# Patient Record
Sex: FEMALE
[De-identification: names, ages, dates, MRNs, and addresses within clinical notes are randomized per-mention and may not be internally consistent; named-entity substitution may affect disease eponyms.]

---

## 2020-06-16 ENCOUNTER — TRANSCRIPTION ENCOUNTER (OUTPATIENT)
Age: 30
End: 2020-06-16

## 2021-02-03 ENCOUNTER — EMERGENCY (EMERGENCY)
Facility: HOSPITAL | Age: 31
LOS: 1 days | Discharge: HOME | End: 2021-02-03
Attending: EMERGENCY MEDICINE | Admitting: EMERGENCY MEDICINE
Payer: MEDICAID

## 2021-02-03 VITALS
SYSTOLIC BLOOD PRESSURE: 133 MMHG | HEART RATE: 87 BPM | DIASTOLIC BLOOD PRESSURE: 80 MMHG | RESPIRATION RATE: 18 BRPM | TEMPERATURE: 98 F | OXYGEN SATURATION: 100 %

## 2021-02-03 PROCEDURE — 99284 EMERGENCY DEPT VISIT MOD MDM: CPT

## 2021-02-03 PROCEDURE — 73630 X-RAY EXAM OF FOOT: CPT | Mod: 26,RT

## 2021-02-03 PROCEDURE — 73590 X-RAY EXAM OF LOWER LEG: CPT | Mod: 26,RT

## 2021-02-03 PROCEDURE — 73610 X-RAY EXAM OF ANKLE: CPT | Mod: 26,RT

## 2021-02-03 RX ORDER — IBUPROFEN 200 MG
600 TABLET ORAL ONCE
Refills: 0 | Status: COMPLETED | OUTPATIENT
Start: 2021-02-03 | End: 2021-02-03

## 2021-02-03 RX ORDER — METHADONE HYDROCHLORIDE 40 MG/1
65 TABLET ORAL ONCE
Refills: 0 | Status: DISCONTINUED | OUTPATIENT
Start: 2021-02-03 | End: 2021-02-03

## 2021-02-03 RX ORDER — ACETAMINOPHEN 500 MG
650 TABLET ORAL ONCE
Refills: 0 | Status: COMPLETED | OUTPATIENT
Start: 2021-02-03 | End: 2021-02-03

## 2021-02-03 RX ADMIN — Medication 650 MILLIGRAM(S): at 14:03

## 2021-02-03 RX ADMIN — Medication 600 MILLIGRAM(S): at 15:52

## 2021-02-03 RX ADMIN — METHADONE HYDROCHLORIDE 65 MILLIGRAM(S): 40 TABLET ORAL at 15:03

## 2021-02-03 NOTE — ED PROVIDER NOTE - OBJECTIVE STATEMENT
Pt is a 29 yo F who present with R ankle injury. She slipped on ice today walking to the bus, twisted her R ankle, has been unable to put weight on it since. Was on her way to [facility] methadone clinic but has now missed dose, would like to get dose here. Pt denies LOC, hitting head, or any other msk injury. Has injured R ankle before but no surgery on it prior. Denies numbness, tingling

## 2021-02-03 NOTE — ED ADULT TRIAGE NOTE - CHIEF COMPLAINT QUOTE
PT C/O right ankle pain and swelling S/P mechanical fall. states walking I snow tripper and fell. Denies head trauma, LOC, AC use. PHX opoid dependance on methadone.

## 2021-02-03 NOTE — ED PROVIDER NOTE - PATIENT PORTAL LINK FT
You can access the FollowMyHealth Patient Portal offered by [facility] by registering at the following website: [email or web address]. By joining [email or web address] FollowMyHealth portal, you will also be able to view your health information using other applications (apps) compatible with our system.

## 2021-02-03 NOTE — ED PROVIDER NOTE - PROGRESS NOTE DETAILS
Cassidy: spoke with Pearl at [facility], [phone]. She confirmed dose of 65 mg Methadone, and confirmed pt has not gotten it today. I relayed we would give it here Left podiatry saw and put in splint, pt will f/u in clinic

## 2021-02-03 NOTE — ED PROVIDER NOTE - PHYSICAL EXAMINATION
Krissy Rivera MD  GENERAL: Patient awake alert NAD.  HEENT: NC/AT, Moist mucous membranes, PERRL, EOMI.  LUNGS: normal work of breathing  EXT: No leg edema. No calf tenderness. CV 2+DP/PT bilaterally.   R foot/ankle: hematoma over lateral malleolus, no laceration, mild ecchymosis. Pain over 4th and 5th metatarsal. Able to move all toes and ankle, pulse intact, skin intact. No pain at medial malleolus, or tibial plateau.   L foot/ankle: normal exam  MSK: No spinal tenderness, no pain with movement, no deformities.  NEURO: A&Ox3. Moving all extremities.  SKIN: Warm and dry. No rash.  PSYCH: Normal affect.

## 2021-02-03 NOTE — ED PROVIDER NOTE - NSFOLLOWUPINSTRUCTIONS_ED_ALL_ED_FT
You were seen in the ED for ankle pain.   Xrays showed you have a fracture of your right distal fibula.    Please follow up with the podiatry clinic. Call [phone] to make an appointment.     Rest, ice, and elevate your leg. Use crutches and do not put weight on your injured leg.    For pain, please take 650mg Tylenol every 6 hours as needed or 600mg ibuprofen every 8 hours as needed. Always take ibuprofen with food. You make take both Tylenol and ibuprofen as described above if one medication is not enough for your pain.    Please follow up with podiatry in a few days. Return to the ED if you experience any worsening or new symptoms or any symptoms that concern you, including fevers, chills, numbness, increasing foot/leg pain.

## 2021-02-03 NOTE — CONSULT NOTE ADULT - SUBJECTIVE AND OBJECTIVE BOX
Podiatry pager #: [phone]/ [phone]    Patient is a 30y old  Female who presents with a chief complaint of right ankle fracture.    HPI:  Pt is a 31 yo F who present with R ankle injury. She slipped on ice today walking to the bus, twisted her R ankle, has been unable to put weight on it since. Was on her way to [facility] methadone clinic but has now missed dose, would like to get dose here. Pt denies LOC, hitting head, or any other msk injury. Has injured R ankle before but no surgery on it prior. Denies numbness, tingling    PAST MEDICAL & SURGICAL HISTORY:  Asthma, mild intermittent, uncomplicated    No significant past surgical history        MEDICATIONS  (STANDING):    MEDICATIONS  (PRN):      Allergies    No Known Allergies    Intolerances        VITALS:    Vital Signs Last 24 Hrs  T(C): 36.7 (03 Feb 2021 12:33), Max: 36.7 (03 Feb 2021 12:33)  T(F): 98.1 (03 Feb 2021 12:33), Max: 98.1 (03 Feb 2021 12:33)  HR: 87 (03 Feb 2021 12:33) (87 - 87)  BP: 133/80 (03 Feb 2021 12:33) (133/80 - 133/80)  BP(mean): --  RR: 18 (03 Feb 2021 12:33) (18 - 18)  SpO2: 100% (03 Feb 2021 12:33) (100% - 100%)    LABS:                CAPILLARY BLOOD GLUCOSE              LOWER EXTREMITY PHYSICAL EXAM:    Vasular: DP/PT 2/4, B/L, CFT <3 seconds B/L, Temperature gradient WNL, B/L.   Neuro: Epicritic sensation intact to the level of digits, B/L.  Musculoskeletal/Ortho:    Right ankle edema and ecchymosis over lateral malleolus, w/ pain on palpation to the distal fibula. No pain on palpation of the heel, medial mal or posterior mal. Patient is able to actively DF/ PF at the ankle and the digits, there is no concern for compartment syndrome. No open lesions.    RADIOLOGY & ADDITIONAL STUDIES:    < from: Xray Foot AP + Lateral + Oblique, Right (02.03.21 @ 14:42) >    EXAM:  RAD FOOT MIN 3 VIEWS RIGHT      EXAM:  RAD TIB-FIB RT      EXAM:  RAD ANKLE MIN 3 VIEWS RIGHT        PROCEDURE DATE:  Feb  3 2021         INTERPRETATION:  CLINICAL INDICATION: right ankle and foot pain and swelling status post fall    EXAM:  AP and lateral right leg and frontal oblique and lateral right ankle and foot from 2/3/2021 at 1442. No similar prior studies available for comparison.    IMPRESSION:  Predominantly lateral ankle soft tissue swelling.    Acute nondisplaced distal fibular fracture below joint line level. No dislocations or additional fractures in the remaining imaged regions.    Congruent ankle mortise with smooth and intact talar dome. Tarsometatarsal alignment maintained without evidence for a Lisfranc injury.    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 Preserved remaining joint spaces and no joint margin erosions.    Type I accessory navicular ossicle noted.    No discrete lytic or blastic lesions.              TORREY AGUILAR MD; Attending Radiologist  This document has been electronically signed. Feb  3 2021  3:40PM    < end of copied text >

## 2021-02-03 NOTE — ED PROVIDER NOTE - NS ED ROS FT
GENERAL: No fever, chills  EYES: no vision changes, no discharge.   ENT: no difficulty swallowing or speaking   CARDIAC: no chest pain/pressure, SOB, lower extremity swelling  PULMONARY: no cough, SOB  GI: no abdominal pain, n/v/d  : no dysuria  SKIN: no rashes  NEURO: no headache, lightheadedness, paresthesia  MSK: +R ankle and foot pain. No joint pain, myalgia, weakness.

## 2021-02-03 NOTE — ED PROVIDER NOTE - CLINICAL SUMMARY MEDICAL DECISION MAKING FREE TEXT BOX
Nicole: 31 yo F on methadone presenting with R ankle pain. Will give outpt methadone dose that she missed. xray foot, ankle, tib fib

## 2021-02-03 NOTE — ED PROVIDER NOTE - ATTENDING CONTRIBUTION TO CARE
Dr. Arzate:  I have personally performed a face to face bedside history and physical examination of this patient. I have discussed the history, examination, review of systems, assessment and plan of management with the resident. I have reviewed the electronic medical record and amended it to reflect my history, review of systems, physical exam, assessment and plan.    30F presents with R ankle pain after inversion injury due to slipping on ice earlier today.  Denies other complaints.    Exam:  - nad  - rrr  - +swelling to R lateral malleolus and tender to palpation, distally neurovascularly intact    A/P  - ankle injury, eval fracture vs sprain  - XR right ankle/foot/tib/fib  - pt missed methadone appointment, will confirm dose here

## 2021-02-03 NOTE — ED PROVIDER NOTE - NSFOLLOWUPCLINICS_GEN_ALL_ED_FT
[facility]  Podiatry  [address] [place] [address] - [address] [place] [address]  Phone: [phone]  Fax:   Follow Up Time:

## 2021-02-03 NOTE — CONSULT NOTE ADULT - ASSESSMENT
Pt is 29 yo who presents w/ Right distal fibular fracture  -pt was seen and examined  -Right ankle edema and ecchymosis over lateral malleolus, w/ pain on palpation to the distal fibula.   -Right Ankle Xray: Acute nondisplaced distal fibular fracture below joint line level. No dislocations or additional fractures in the remaining imaged regions.  -Posterior splint applied to RLE, crutches dispensed. Pt to remain NWB to RLE w/ the use of crutches  -Rec rest, icing, elevation and NSAIDs PRN pain  -To follow up w/ Dr. Spring within the week at [phone]

## 2024-02-07 ENCOUNTER — APPOINTMENT (OUTPATIENT)
Dept: OBGYN | Facility: CLINIC | Age: 34
End: 2024-02-07

## 2024-03-13 ENCOUNTER — APPOINTMENT (OUTPATIENT)
Dept: OBGYN | Facility: CLINIC | Age: 34
End: 2024-03-13
Payer: MEDICAID

## 2024-03-13 ENCOUNTER — OUTPATIENT (OUTPATIENT)
Dept: OUTPATIENT SERVICES | Facility: HOSPITAL | Age: 34
LOS: 1 days | End: 2024-03-13
Payer: COMMERCIAL

## 2024-03-13 VITALS
BODY MASS INDEX: 32.94 KG/M2 | DIASTOLIC BLOOD PRESSURE: 87 MMHG | RESPIRATION RATE: 18 BRPM | SYSTOLIC BLOOD PRESSURE: 138 MMHG | HEIGHT: 62 IN | HEART RATE: 93 BPM | TEMPERATURE: 97.7 F | OXYGEN SATURATION: 97 % | WEIGHT: 179 LBS

## 2024-03-13 DIAGNOSIS — Z82.49 FAMILY HISTORY OF ISCHEMIC HEART DISEASE AND OTHER DISEASES OF THE CIRCULATORY SYSTEM: ICD-10-CM

## 2024-03-13 DIAGNOSIS — Z00.00 ENCOUNTER FOR GENERAL ADULT MEDICAL EXAMINATION WITHOUT ABNORMAL FINDINGS: ICD-10-CM

## 2024-03-13 DIAGNOSIS — F19.10 OTHER PSYCHOACTIVE SUBSTANCE ABUSE, UNCOMPLICATED: ICD-10-CM

## 2024-03-13 DIAGNOSIS — Z82.3 FAMILY HISTORY OF STROKE: ICD-10-CM

## 2024-03-13 DIAGNOSIS — F19.21 OTHER PSYCHOACTIVE SUBSTANCE DEPENDENCE, IN REMISSION: ICD-10-CM

## 2024-03-13 DIAGNOSIS — F17.210 NICOTINE DEPENDENCE, CIGARETTES, UNCOMPLICATED: ICD-10-CM

## 2024-03-13 PROCEDURE — 87624 HPV HI-RISK TYP POOLED RSLT: CPT

## 2024-03-13 PROCEDURE — 87491 CHLMYD TRACH DNA AMP PROBE: CPT

## 2024-03-13 PROCEDURE — 87591 N.GONORRHOEAE DNA AMP PROB: CPT

## 2024-03-13 PROCEDURE — 87800 DETECT AGNT MULT DNA DIREC: CPT

## 2024-03-13 PROCEDURE — G0463: CPT

## 2024-03-13 PROCEDURE — 99204 OFFICE O/P NEW MOD 45 MIN: CPT

## 2024-03-13 RX ORDER — METHADONE HYDROCHLORIDE 5 MG/1
TABLET ORAL
Refills: 0 | Status: ACTIVE | COMMUNITY

## 2024-03-13 RX ORDER — MEDROXYPROGESTERONE ACETATE 10 MG/1
10 TABLET ORAL DAILY
Qty: 10 | Refills: 0 | Status: DISCONTINUED | COMMUNITY
Start: 2024-03-13 | End: 2024-03-13

## 2024-03-13 NOTE — PLAN
[FreeTextEntry1] : Annual Gyn exam - pap/HPV/STD sent; Vaginitis - subacute - Affirm sent, treat based on results; Hx/o PCOS  - advised pt to RTO to office if no menses by May for treatment with Provera challenge, unable to start OCP due to borderline HTN, advised pt to d/w PCP if OCP can be started; PCOS symptoms reviewed - discussed weight loss as optimal treatment for symptom relief.   -I spent a total of 50 minutes on date of the encounter reviewing patient's medical / surgical and Ob/Gyn history, evaluating, counseling and treating the patient.

## 2024-03-13 NOTE — HISTORY OF PRESENT ILLNESS
[No] : Patient does not have concerns regarding sex [Currently Active] : currently active [Approximately ___ (Month)] : LMP was approximately [unfilled] month(s) ago [PapSmeardate] : 2017 [Menarche Age: ____] : age at menarche was [unfilled] [Men] : men [FreeTextEntry1] : 1/10/24 [Vaginal] : vaginal [Condoms] : Condoms

## 2024-03-13 NOTE — PHYSICAL EXAM
[Appropriately responsive] : appropriately responsive [Chaperone Present] : A chaperone was present in the examining room during all aspects of the physical examination [No Acute Distress] : no acute distress [Alert] : alert [No Lymphadenopathy] : no lymphadenopathy [Soft] : soft [Non-tender] : non-tender [No HSM] : No HSM [Non-distended] : non-distended [No Lesions] : no lesions [No Mass] : no mass [Oriented x3] : oriented x3 [Labia Majora] : normal [Labia Minora] : normal [Discharge] : a  ~M vaginal discharge was present [White] : white [Scant] : scant [Normal] : normal [Watery] : watery

## 2024-03-14 DIAGNOSIS — F19.21 OTHER PSYCHOACTIVE SUBSTANCE DEPENDENCE, IN REMISSION: ICD-10-CM

## 2024-03-14 DIAGNOSIS — Z12.4 ENCOUNTER FOR SCREENING FOR MALIGNANT NEOPLASM OF CERVIX: ICD-10-CM

## 2024-03-14 DIAGNOSIS — Z12.39 ENCOUNTER FOR OTHER SCREENING FOR MALIGNANT NEOPLASM OF BREAST: ICD-10-CM

## 2024-03-14 DIAGNOSIS — F11.90 OPIOID USE, UNSPECIFIED, UNCOMPLICATED: ICD-10-CM

## 2024-03-14 DIAGNOSIS — R10.2 PELVIC AND PERINEAL PAIN: ICD-10-CM

## 2024-03-14 DIAGNOSIS — E28.2 POLYCYSTIC OVARIAN SYNDROME: ICD-10-CM

## 2024-03-14 DIAGNOSIS — N76.1 SUBACUTE AND CHRONIC VAGINITIS: ICD-10-CM

## 2024-03-14 DIAGNOSIS — Z11.3 ENCOUNTER FOR SCREENING FOR INFECTIONS WITH A PREDOMINANTLY SEXUAL MODE OF TRANSMISSION: ICD-10-CM

## 2024-03-14 DIAGNOSIS — Z01.419 ENCOUNTER FOR GYNECOLOGICAL EXAMINATION (GENERAL) (ROUTINE) WITHOUT ABNORMAL FINDINGS: ICD-10-CM

## 2024-03-14 DIAGNOSIS — I10 ESSENTIAL (PRIMARY) HYPERTENSION: ICD-10-CM

## 2024-03-15 LAB
C TRACH RRNA SPEC QL NAA+PROBE: NOT DETECTED
CANDIDA VAG CYTO: NOT DETECTED
G VAGINALIS+PREV SP MTYP VAG QL MICRO: DETECTED
HPV HIGH+LOW RISK DNA PNL CVX: NOT DETECTED
N GONORRHOEA RRNA SPEC QL NAA+PROBE: NOT DETECTED
SOURCE TP AMPLIFICATION: NORMAL
T VAGINALIS VAG QL WET PREP: NOT DETECTED

## 2024-03-18 LAB — CYTOLOGY CVX/VAG DOC THIN PREP: ABNORMAL

## 2024-03-20 ENCOUNTER — APPOINTMENT (OUTPATIENT)
Dept: ULTRASOUND IMAGING | Facility: CLINIC | Age: 34
End: 2024-03-20
Payer: MEDICAID

## 2024-03-20 PROCEDURE — 76856 US EXAM PELVIC COMPLETE: CPT

## 2024-03-20 PROCEDURE — 76830 TRANSVAGINAL US NON-OB: CPT

## 2024-04-16 ENCOUNTER — APPOINTMENT (OUTPATIENT)
Dept: OBGYN | Facility: CLINIC | Age: 34
End: 2024-04-16
Payer: MEDICAID

## 2024-04-16 ENCOUNTER — OUTPATIENT (OUTPATIENT)
Dept: OUTPATIENT SERVICES | Facility: HOSPITAL | Age: 34
LOS: 1 days | End: 2024-04-16
Payer: COMMERCIAL

## 2024-04-16 VITALS
TEMPERATURE: 98.1 F | DIASTOLIC BLOOD PRESSURE: 84 MMHG | OXYGEN SATURATION: 99 % | WEIGHT: 179 LBS | BODY MASS INDEX: 32.94 KG/M2 | SYSTOLIC BLOOD PRESSURE: 142 MMHG | HEIGHT: 62 IN | HEART RATE: 100 BPM

## 2024-04-16 DIAGNOSIS — Z00.00 ENCOUNTER FOR GENERAL ADULT MEDICAL EXAMINATION WITHOUT ABNORMAL FINDINGS: ICD-10-CM

## 2024-04-16 DIAGNOSIS — Z12.39 ENCOUNTER FOR OTHER SCREENING FOR MALIGNANT NEOPLASM OF BREAST: ICD-10-CM

## 2024-04-16 DIAGNOSIS — N76.1 SUBACUTE AND CHRONIC VAGINITIS: ICD-10-CM

## 2024-04-16 DIAGNOSIS — F11.90 OPIOID USE, UNSPECIFIED, UNCOMPLICATED: ICD-10-CM

## 2024-04-16 DIAGNOSIS — Z98.890 OTHER SPECIFIED POSTPROCEDURAL STATES: ICD-10-CM

## 2024-04-16 DIAGNOSIS — K59.03 DRUG INDUCED CONSTIPATION: ICD-10-CM

## 2024-04-16 DIAGNOSIS — Z01.419 ENCOUNTER FOR GYNECOLOGICAL EXAMINATION (GENERAL) (ROUTINE) W/OUT ABNORMAL FINDINGS: ICD-10-CM

## 2024-04-16 DIAGNOSIS — Z71.2 PERSON CONSULTING FOR EXPLANATION OF EXAMINATION OR TEST FINDINGS: ICD-10-CM

## 2024-04-16 DIAGNOSIS — Z11.3 ENCOUNTER FOR SCREENING FOR INFECTIONS WITH A PREDOMINANTLY SEXUAL MODE OF TRANSMISSION: ICD-10-CM

## 2024-04-16 DIAGNOSIS — R10.2 PELVIC AND PERINEAL PAIN: ICD-10-CM

## 2024-04-16 DIAGNOSIS — I10 ESSENTIAL (PRIMARY) HYPERTENSION: ICD-10-CM

## 2024-04-16 DIAGNOSIS — E28.2 POLYCYSTIC OVARIAN SYNDROME: ICD-10-CM

## 2024-04-16 PROCEDURE — G0463: CPT

## 2024-04-16 PROCEDURE — 99213 OFFICE O/P EST LOW 20 MIN: CPT

## 2024-04-16 RX ORDER — SENNOSIDES 8.6 MG/1
8.6 TABLET ORAL
Qty: 30 | Refills: 3 | Status: ACTIVE | COMMUNITY
Start: 2024-04-16 | End: 1900-01-01

## 2024-04-16 RX ORDER — WHEAT DEXTRIN 1 G
TABLET,CHEWABLE ORAL
Qty: 30 | Refills: 11 | Status: ACTIVE | COMMUNITY
Start: 2024-04-16 | End: 1900-01-01

## 2024-04-16 RX ORDER — METRONIDAZOLE 7.5 MG/G
0.75 GEL VAGINAL
Qty: 1 | Refills: 0 | Status: COMPLETED | COMMUNITY
Start: 2024-03-15 | End: 2024-04-16

## 2024-04-16 NOTE — PHYSICAL EXAM
[Appropriately responsive] : appropriately responsive [Alert] : alert [No Acute Distress] : no acute distress 01-Nov-2018

## 2024-04-16 NOTE — HISTORY OF PRESENT ILLNESS
[Patient reported PAP Smear was normal] : Patient reported PAP Smear was normal [FreeTextEntry1] : Presents for f/u of results:  Pap (03/15/24) Neg / (-) HPV   Pelvic US (03/15/24) WNL - thickened endometrial lining - LMP was (01/10/2024) - pt made aware US performed prior to her next period (04/04/24), which occurred spontaneously, pt didn't take the Provera.  Patient has picked up the rx and is aware going forward if no menses > 3 months then take Provera 10 mg x 10 days to induce menses. Patient states her last period was heavy and painful.  Patient has borderline HTN, was counseled on Mirena IUD as option for treatment of menorrhagia.  Patient will consider in the future.   Patient has hx/o chronic constipation, taking Methadone, attributing to her symptoms, prescribed "powder" by PCP, can't recall name, not helping her symptoms, currently experiencing LLQ pain intermittently.  [PapSmeardate] : 3/13/2024 [TextBox_31] : Wnl

## 2024-04-16 NOTE — PLAN
[FreeTextEntry1] : Hx/o Constipation - Rx Benefiber / Senna sent to pharmacy; Hx/o PCOS - Rx Provera 10 mg x 10 days if menses > (07/2024) advised, or RTO for f/u   -I spent a total of 20 minutes on the date of the encounter reviewing patient's labs, radiology reports, evaluating, counseling and treating the patient.

## 2024-04-17 DIAGNOSIS — F11.90 OPIOID USE, UNSPECIFIED, UNCOMPLICATED: ICD-10-CM

## 2024-04-17 DIAGNOSIS — Z71.2 PERSON CONSULTING FOR EXPLANATION OF EXAMINATION OR TEST FINDINGS: ICD-10-CM

## 2024-04-17 DIAGNOSIS — E28.2 POLYCYSTIC OVARIAN SYNDROME: ICD-10-CM

## 2024-04-17 DIAGNOSIS — K59.03 DRUG INDUCED CONSTIPATION: ICD-10-CM

## 2024-04-17 DIAGNOSIS — I10 ESSENTIAL (PRIMARY) HYPERTENSION: ICD-10-CM
